# Patient Record
Sex: FEMALE | Race: WHITE
[De-identification: names, ages, dates, MRNs, and addresses within clinical notes are randomized per-mention and may not be internally consistent; named-entity substitution may affect disease eponyms.]

---

## 2017-01-15 ENCOUNTER — HOSPITAL ENCOUNTER (EMERGENCY)
Dept: HOSPITAL 59 - ER | Age: 32
Discharge: HOME | End: 2017-01-15
Payer: MEDICAID

## 2017-01-15 DIAGNOSIS — K02.9: Primary | ICD-10-CM

## 2017-01-15 PROCEDURE — 99282 EMERGENCY DEPT VISIT SF MDM: CPT

## 2017-01-15 NOTE — EMERGENCY DEPARTMENT RECORD
History of Present Illness





- General


Chief complaint: Dental


Stated complaint: DENTAL PAIN


Time Seen by Provider: 01/15/17 10:05


Source: Patient


Mode of Arrival: Ambulatory


Limitations: No limitations





- History of Present Illness


Initial comments: 


The patient is here due to L sided dental pain for 1 day. The upper and lower 

molars on the L seem to be painful. She denies any swelling, fever, chills, or 

trauma.





MD complaint: Tooth pain


Onset/Timin


-: Days(s)


Severity: Moderate


Severity scale (1-10): 10


Quality: Aching, Sharp


Consistency: Constant


Improves with: None





- Related Data


 Previous Rx's











 Medication  Instructions  Recorded


 


Naproxen [Naprosyn] 500 mg PO BID #14 tablet. 01/15/17


 


Penicillin V Potassium 500 mg PO QID #28 tablet 01/15/17











 Allergies











Allergy/AdvReac Type Severity Reaction Status Date / Time


 


diphenhydramine HCl Allergy Severe SWELLING Verified 01/15/17 10:00





[From Benadryl]   OF THE FACE  


 


divalproex sodium Allergy Severe RAPID Verified 01/15/17 10:00





[From Depakote]   HEART RATE  


 


citalopram hydrobromide Allergy Intermediate RASH Verified 01/15/17 10:00





[From Celexa]     


 


escitalopram oxalate Allergy Intermediate RASH Verified 01/15/17 10:00





[From Lexapro]     


 


haloperidol [From Haldol] Allergy Intermediate RASH Verified 01/15/17 10:00


 


haloperidol lactate Allergy Intermediate RASH Verified 01/15/17 10:00





[From Haldol]     


 


latex Allergy Intermediate RASH Verified 01/15/17 10:00














Travel Screening





- Travel/Exposure Within Last 30 Days


Have you traveled within the last 30 days?: No





- Travel/Exposure Within Last Year


Have you traveled outside the [place] in the last year?: No





- Additonal Travel Details


Have you been exposed to anyone with a communicable illness?: No





- Travel Symptoms


Symptom Screening: None





Review of Systems


Constitutional: Denies: Chills, Fever


Eyes: Denies: Eye discharge


ENT: Denies: Congestion


Respiratory: Denies: Cough, Dyspnea





Past Medical History





- SOCIAL HISTORY


Smoking Status: Current every day smoker


Alcohol Use: Occassional


Drug Use: None





- RESPIRATORY


Hx Respiratory Disorders: No





- CARDIOVASCULAR


Hx Cardio Disorders: Yes


Hx Hypertension: Yes





- NEURO


Hx Neuro Disorders: No





- GI


Hx GI Disorders: No





- 


Hx Genitourinary Disorders: No





- ENDOCRINE


Hx Endocrine Disorders: No





- MUSCULOSKELETAL


Hx Musculoskeletal Disorders: No





- PSYCH


Hx Psych Problems: Yes


Hx Anxiety: Yes





- HEMATOLOGY/ONCOLOGY


Hx Hematology/Oncology Disorders: No





Family Medical History


Any Significant Family History?: Yes


Hx Cancer: Father, Mother


Hx Depression: Father, Mother


Hx Stroke: Grandparents





Physical Exam





- General


General Appearance: Alert, Oriented x3, Cooperative, No acute distress





- Head


Head exam: Atraumatic, Normocephalic, Normal inspection





- Eye


Eye exam: Normal appearance, PERRL





- ENT


Teeth exam: Dental caries, Dental tenderness # (14, 15, and 18, 19. There is no 

abscess or gum line swelling.).  negative: Normal inspection


Throat exam: Normal inspection.  negative: Tonsillar erythema, Tonsillomegaly





- Neck


Neck exam: Normal inspection, Full ROM.  negative: Tenderness





- Respiratory


Respiratory exam: Normal lung sounds bilaterally.  negative: Respiratory 

distress





Course





 Vital Signs











  01/15/17 01/15/17





  09:55 10:01


 


Temperature 97.6 F 97.4 F L


 


Pulse Rate [  65





Pulse Ox Probe]  


 


Respiratory  12





Rate  


 


Blood Pressure  158/96





[Left Arm]  


 


Pulse Ox  98














- Reevaluation(s)


Reevaluation #1: 


I did discuss the plan with the patient to take the medicines and F/U with her 

Dentist ASAP.





01/15/17 10:12








Disposition


Disposition: Discharge


Clinical Impression: 


 Pain, dental


Disposition: Home, Self-Care


Condition: (1) Good


Instructions:  Toothache (ED)


Additional Instructions: 


Please take the Penicillin and Naprosyn as directed. Please see your Dentist 

tomorrow for recheck.


Prescriptions: 


Naproxen [Naprosyn] 500 mg PO BID #14 tablet.


Penicillin V Potassium 500 mg PO QID #28 tablet


Forms:  Patient Portal Access


Time of Disposition: 10:09

## 2017-04-16 ENCOUNTER — HOSPITAL ENCOUNTER (EMERGENCY)
Dept: HOSPITAL 59 - ER | Age: 32
Discharge: HOME | End: 2017-04-16
Payer: MEDICAID
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
## 2017-04-16 DIAGNOSIS — R51: ICD-10-CM

## 2017-04-16 DIAGNOSIS — R03.0: ICD-10-CM

## 2017-04-16 DIAGNOSIS — S40.011A: Primary | ICD-10-CM
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
## 2017-04-16 PROCEDURE — 99283 EMERGENCY DEPT VISIT LOW MDM: CPT

## 2017-04-16 PROCEDURE — 99284 EMERGENCY DEPT VISIT MOD MDM: CPT
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
## 2017-09-08 ENCOUNTER — HOSPITAL ENCOUNTER (EMERGENCY)
Dept: HOSPITAL 59 - ER | Age: 32
LOS: 1 days | Discharge: HOME | End: 2017-09-09
Payer: MEDICAID

## 2017-09-08 DIAGNOSIS — R11.2: ICD-10-CM

## 2017-09-08 DIAGNOSIS — R10.11: Primary | ICD-10-CM

## 2017-09-08 PROCEDURE — 81003 URINALYSIS AUTO W/O SCOPE: CPT

## 2017-09-08 PROCEDURE — 96375 TX/PRO/DX INJ NEW DRUG ADDON: CPT

## 2017-09-08 PROCEDURE — 99284 EMERGENCY DEPT VISIT MOD MDM: CPT

## 2017-09-08 PROCEDURE — 83690 ASSAY OF LIPASE: CPT

## 2017-09-08 PROCEDURE — 96374 THER/PROPH/DIAG INJ IV PUSH: CPT

## 2017-09-08 PROCEDURE — 85025 COMPLETE CBC W/AUTO DIFF WBC: CPT

## 2017-09-08 PROCEDURE — 81025 URINE PREGNANCY TEST: CPT

## 2017-09-08 PROCEDURE — 74176 CT ABD & PELVIS W/O CONTRAST: CPT

## 2017-09-08 PROCEDURE — 80053 COMPREHEN METABOLIC PANEL: CPT

## 2017-09-09 LAB
ALBUMIN SERPL-MCNC: 4 GM/DL (ref 3.5–5)
ALBUMIN/GLOB SERPL: 1.3 {RATIO} (ref 1.1–1.8)
ALP SERPL-CCNC: 67 U/L (ref 38–126)
ALT SERPL-CCNC: 32 U/L (ref 9–52)
ANION GAP SERPL CALC-SCNC: 7.2 MMOL/L (ref 7–16)
APPEARANCE UR: CLEAR
AST SERPL-CCNC: 15 U/L (ref 14–36)
BASOPHILS NFR BLD: 0.2 % (ref 0–6)
BILIRUB SERPL-MCNC: 0.45 MG/DL (ref 0.2–1.3)
BILIRUB UR-MCNC: NEGATIVE MG/DL
BUN SERPL-MCNC: 15 MG/DL (ref 7–17)
CO2 SERPL-SCNC: 24.8 MMOL/L (ref 22–30)
COLOR UR: YELLOW
CREAT SERPL-MCNC: 0.8 MG/DL (ref 0.52–1.04)
EOSINOPHIL NFR BLD: 1.6 % (ref 0–6)
ERYTHROCYTE [DISTWIDTH] IN BLOOD BY AUTOMATED COUNT: 13.2 % (ref 11.5–14.5)
EST GLOMERULAR FILTRATION RATE: > 60 ML/MIN
GLOBULIN SER-MCNC: 3 GM/DL (ref 1.4–4.8)
GLUCOSE SERPL-MCNC: 99 MG/DL (ref 70–110)
GLUCOSE UR STRIP-MCNC: NEGATIVE MG/DL
GRANULOCYTES NFR BLD: 53 % (ref 47–80)
HCG,QUALITATIVE URINE: NEGATIVE
HCT VFR BLD CALC: 41.1 % (ref 35–47)
HGB BLD-MCNC: 14.2 GM/DL (ref 11.6–16)
KETONES UR QL STRIP: NEGATIVE
LIPASE SERPL-CCNC: 107 U/L (ref 23–300)
LYMPHOCYTES NFR BLD AUTO: 37.3 % (ref 16–45)
MCH RBC QN AUTO: 31.1 PG (ref 27–33)
MCHC RBC AUTO-ENTMCNC: 34.5 G/DL (ref 32–36)
MCV RBC AUTO: 90.1 FL (ref 81–97)
MONOCYTES NFR BLD: 7.9 % (ref 0–9)
NITRITE UR QL STRIP: NEGATIVE
PLATELET # BLD: 266 K/UL (ref 130–400)
PMV BLD AUTO: 11.2 FL (ref 7.4–10.4)
PROT SERPL-MCNC: 7 GM/DL (ref 6.3–8.2)
PROT UR QL STRIP: NEGATIVE
RBC # BLD AUTO: 4.56 M/UL (ref 3.8–5.4)
RBC # UR STRIP: NEGATIVE /UL
URINE LEUKOCYTE ESTERASE: NEGATIVE
UROBILINOGEN UR STRIP-ACNC: 0.2 E.U./DL (ref 0.2–1)
WBC # BLD AUTO: 12.2 K/UL (ref 4.2–12.2)

## 2017-09-09 NOTE — EMERGENCY DEPARTMENT RECORD
History of Present Illness





- General


Chief Complaint: Abdominal Pain


Stated Complaint: ABDOMINAL PAIN


Time Seen by Provider: 17 23:25


Source: Patient, Family


Mode of Arrival: Ambulatory


Limitations: No limitations





- History of Present Illness


Initial Comments: 


33 yo female presents with 5 days of nausea, vomiting, urinary frequency, 

abdominal pain that starts on the right side.  No hematuria.  No diarrhea.  No 

fever.  She has a history of  2 times and histerectomy.  No rash.  No 

fever.  She has had gall bladder issues in the past.  No blood in the vomit or 

diarrhea.  No sick contacts.  No pain with urination.





MD Complaint: Abdominal pain


Onset/Timin


-: Week(s)


Location: R Flank


Radiation: LLQ, RLQ


Migration to: RUQ


Severity: Moderate


Quality: Cramping, Sharp, Stabbing


Consistency: Intermittent


Improves With: Nothing


Worsens With: Nothing


Associated Symptoms: Anorexia, Nausea, Vomiting, Other





- Related Data


Patient Pregnant: No


 Previous Rx's











 Medication  Instructions  Recorded


 


Hydrocodone/Acetaminophen [Norco 1 each PO Q8H #8 tablet 17





5-325 Tablet]  


 


Ondansetron [Zofran Odt] 4 mg PO Q8H #15 tab.rapdis 17











 Allergies











Allergy/AdvReac Type Severity Reaction Status Date / Time


 


diphenhydramine HCl Allergy Severe SWELLING Unverified 17 09:29





[From Benadryl]   OF THE FACE  


 


divalproex sodium Allergy Severe RAPID Unverified 17 09:29





[From Depakote]   HEART RATE  


 


citalopram hydrobromide Allergy Intermediate RASH Unverified 17 09:29





[From Celexa]     


 


escitalopram oxalate Allergy Intermediate RASH Unverified 17 09:29





[From Lexapro]     


 


haloperidol [From Haldol] Allergy Intermediate RASH Unverified 17 09:29


 


haloperidol lactate Allergy Intermediate RASH Unverified 17 09:29





[From Haldol]     


 


latex Allergy Intermediate RASH Unverified 17 09:29














Travel Screening





- Travel/Exposure Within Last 30 Days


Have you traveled within the last 30 days?: No





- Travel Symptoms


Symptom Screening: Vomiting, Stomach Pain





Review of Systems


Constitutional: Denies: Chills, Fever, Malaise, Weakness


Eyes: Denies: Eye discharge, Eye pain, Photophobia


ENT: Denies: Congestion, Throat pain


Respiratory: Denies: Cough, Dyspnea, Hemoptysis, Stridor, Wheezes


Cardiovascular: Denies: Chest pain, Palpitations, Syncope


Endocrine: Reports: Polyuria.  Denies: Fatigue


Gastrointestinal: Reports: Abdominal pain, Nausea, Vomiting.  Denies: Diarrhea, 

Hematemesis, Hematochezia, Melena


Genitourinary: Reports: Frequency, Urgency.  Denies: Discharge, Dysuria, 

Hematuria, Incontinence, Retention


Musculoskeletal: Reports: Back pain.  Denies: Arthralgia, Joint swelling, 

Myalgia, Neck pain


Skin: Denies: Bruising, Change in color, Rash


Neurological: Denies: Headache, Numbness, Vertigo, Weakness


Psychiatric: Denies: Anxiety


Hematological/Lymphatic: Denies: Blood Clots, Easy bleeding, Easy bruising, 

Swollen glands





Past Medical History





- SOCIAL HISTORY


Smoking Status: Current every day smoker





- RESPIRATORY


Hx Respiratory Disorders: No





- CARDIOVASCULAR


Hx Cardio Disorders: Yes


Hx Hypertension: Yes





- NEURO


Hx Neuro Disorders: No





- GI


Hx GI Disorders: No





- 


Hx Genitourinary Disorders: No





- ENDOCRINE


Hx Endocrine Disorders: No





- MUSCULOSKELETAL


Hx Musculoskeletal Disorders: No





- PSYCH


Hx Psych Problems: Yes


Hx Anxiety: Yes


Comment:: PTSD. scizophernia





- HEMATOLOGY/ONCOLOGY


Hx Hematology/Oncology Disorders: No





Family Medical History


Any Significant Family History?: Yes


Hx Cancer: Father, Mother


Hx Depression: Father, Mother


Hx Stroke: Grandparents





Physical Exam





- General


General Appearance: Alert, Oriented x3, Cooperative, No acute distress


Limitations: No limitations





- Head


Head exam: Atraumatic, Normal inspection





- Eye


Eye exam: Normal appearance, PERRL.  negative: Conjunctival injection, 

Periorbital swelling





- ENT


ENT exam: Normal exam, Mucous membranes moist, Normal orophraynx.  negative: 

Mucous membranes dry


Ear exam: Normal external inspection


Nasal Exam: Normal inspection


Mouth exam: Normal external inspection





- Neck


Neck exam: Normal inspection, Full ROM.  negative: Tenderness





- Respiratory


Respiratory exam: Normal lung sounds bilaterally.  negative: Respiratory 

distress





- Cardiovascular


Cardiovascular Exam: Regular rate, Normal rhythm, Normal heart sounds





- GI/Abdominal


GI/Abdominal exam: Soft, Tenderness (tender in the RUQ, right flank, soft, non 

distended).  negative: Distended, Guarding





- Rectal


Rectal exam: Deferred





- 


 exam: Deferred





- Extremities


Extremities exam: Normal inspection, Full ROM, Normal capillary refill.  

negative: Tenderness





- Back


Back exam: Reports: Normal inspection, CVA tenderness (R), Full ROM, 

Tenderness.  Denies: Muscle spasm, Rash noted





- Neurological


Neurological exam: Alert, Normal gait, Oriented X3





- Psychiatric


Psychiatric exam: Normal affect, Normal mood.  negative: Agitated, Anxious





- Skin


Skin exam: Dry, Intact, Normal color, Warm





Course





 Vital Signs











  17





  23:57


 


Temperature 98.1 F


 


Pulse Rate [ 71





Pulse Ox Probe] 


 


Respiratory 20





Rate 


 


Blood Pressure 138/78





[Left Arm] 


 


Pulse Ox 97














- Reevaluation(s)


Reevaluation #1: 


UA is normal with Spec Gravity of 1.030


17 00:45





Reevaluation #2: 


No acute changes on the CBC





[facility] CT scan demonstrates benign right adrenal adenoma, 2.1 cm benign left 

ovarian cyst.


17 01:09





Reevaluation #3: 


No acute changes on the CMP or Lipase


17 01:16








Medical Decision Making





- Lab Data


Result diagrams: 


 17 00:42





 17 00:42





Disposition


Disposition: Discharge


Clinical Impression: 


Vomiting


Qualifiers:


 Vomiting type: unspecified Vomiting Intractability: non-intractable Nausea 

presence: with nausea Qualified Code(s): R11.2 - Nausea with vomiting, 

unspecified





Abdominal pain


Qualifiers:


 Abdominal location: right upper quadrant Qualified Code(s): R10.11 - Right 

upper quadrant pain





Disposition: Home, Self-Care


Condition: (1) Good


Instructions:  Abdominal Pain (ED)


Additional Instructions: 


Return to the ER if you have fever, uncontrolled nausea, pain or any new 

concerns


Recheck your urine with your doctor first of the week if your urinary symptoms 

continue


If you pain continues then you may need further work up that could include a 

HIDA scan or GI consult.


Prescriptions: 


Hydrocodone/Acetaminophen [Norco 5-325 Tablet] 1 each PO Q8H #8 tablet


Ondansetron [Zofran Odt] 4 mg PO Q8H #15 tab.rapdis


Forms:  Patient Portal Access


Time of Disposition: 01:18





Quality





- Quality Measures


Quality Measures: N/A





- Blood Pressure Screening


Does Patient Have Any of the Following: No


Blood Pressure Classification: Pre-Hypertensive BP Reading


Systolic Measurement: 138


Diastolic Measurement: 78


Screening for High Blood Pressure: < Pre-Hypertensive BP, F/U Documented > [

]


Pre-Hypertensive Follow-up Interventions: Referral to alternative/primary care 

provider.

## 2017-09-09 NOTE — CT SCAN REPORT
EXAM:  CT SCAN ABDOMEN/PELVIS WO CONTRAST



HISTORY:  ACUTE RIGHT LOWER QUADRANT ABDOMINAL PAIN, FLANK PAIN.



COMPARISON:  None.



TECHNIQUE:  Contiguous axial images from the lung bases through the symphysis 
pubis were obtained without IV contrast.



FINDINGS:  The lung bases are clear. The liver, spleen, and kidneys are 
unremarkable. No calculi or hydronephrosis. Small right adrenal nodule 
measuring -5 Hounsfield units in density and 16 mm in size consistent with 
benign adenoma. Normal left adrenal. The pancreas and gallbladder are normal. 



Visualized loops of small and large bowel are of normal caliber with normal 
appendix. Small amount of fecal material throughout the colon. No bowel wall 
thickening. 



The urinary bladder is unremarkable. No free intraperitoneal fluid or 
adenopathy. Minimal aortic calcification. No lytic or blastic osseous lesion.



IMPRESSION: 



1.  NO ACUTE PROCESS OF THE ABDOMEN OR PELVIS. NORMAL APPENDIX.

2.  BENIGN RIGHT ADRENAL ADENOMA. 



JOB NUMBER:  [id number]
[facility]

## 2017-11-14 ENCOUNTER — HOSPITAL ENCOUNTER (EMERGENCY)
Dept: HOSPITAL 59 - ER | Age: 32
Discharge: HOME | End: 2017-11-14
Payer: MEDICAID

## 2017-11-14 DIAGNOSIS — F43.10: ICD-10-CM

## 2017-11-14 DIAGNOSIS — F41.9: Primary | ICD-10-CM

## 2017-11-14 DIAGNOSIS — F32.89: ICD-10-CM

## 2017-11-14 DIAGNOSIS — F20.9: ICD-10-CM

## 2017-11-14 PROCEDURE — 99282 EMERGENCY DEPT VISIT SF MDM: CPT

## 2017-11-14 NOTE — EMERGENCY DEPARTMENT RECORD
History of Present Illness





- General


Chief Complaint: Depression


Stated Complaint: MEDS MAKING PT FEEL DEPRESSED


Time Seen by Provider: 17 15:06


Source: Patient


Mode of Arrival: Ambulatory


Limitations: No limitations


Travel/Exposure to [place] Within 21 Days of Symptoms: No





- History of Present Illness


Initial Comments: 





pt has been on paxil since  by her family doctor and feels it is not 

working.  she feels angry and is breaking things.  she denies being suicidal or 

homicidal.  she has had 2 recent funerals and is depressed.  she attempted to 

be seen by [facility] and was sent over here


MD Complaint: Feels depressed


Onset/Timin


-: Week(s)


Associated Psychiatric Symptoms: Depression, Racing thoughts


History of same: Yes


Quality: Intermittent


Improves With: None


Worsens With: Medication


Context: New medication(s), Significant life stressor


Associated Symptoms: Denies other symptoms


Treatments Prior to Arrival: None





- [place] Coma Scale


Eye Response: (4) Open spontaneously


Motor Response: (6) Obeys commands


Verbal Response: (5) Oriented


[place] Total: 15





- Related Data


 Previous Rx's











 Medication  Instructions  Recorded


 


Ondansetron [Zofran Odt] 4 mg PO Q8H #15 tab.rapdis 17


 


Lorazepam [Ativan] 0.5 mg PO TID #20 tablet 17











 Allergies











Allergy/AdvReac Type Severity Reaction Status Date / Time


 


diphenhydramine HCl Allergy Severe SWELLING Verified 17 14:40





[From Benadryl]   OF THE FACE  


 


divalproex sodium Allergy Severe RAPID Verified 17 14:40





[From Depakote]   HEART RATE  


 


citalopram hydrobromide Allergy Intermediate RASH Verified 17 14:40





[From Celexa]     


 


escitalopram oxalate Allergy Intermediate RASH Verified 17 14:40





[From Lexapro]     


 


haloperidol [From Haldol] Allergy Intermediate RASH Verified 17 14:40


 


haloperidol lactate Allergy Intermediate RASH Verified 17 14:40





[From Haldol]     


 


latex Allergy Intermediate RASH Verified 17 14:40














Review of Systems


Reviewed: No additional complaints except as noted below


Constitutional: Reports: As per HPI.  Denies: Chills, Fever, Malaise, Night 

sweats, Weakness, Weight change


Eyes: Reports: As per HPI.  Denies: Eye discharge, Eye pain, Photophobia, 

Vision change


ENT: Reports: As per HPI.  Denies: Congestion, Dental pain, Ear pain, Epistaxis

, Hearing loss, Throat pain


Respiratory: Reports: As per HPI.  Denies: Cough, Dyspnea, Hemoptysis, Stridor, 

Wheezes


Cardiovascular: Reports: As per HPI.  Denies: Arrhythmia, Chest pain, Dyspnea 

on exertion, Edema, Murmurs, Orthopnea, Palpitations, Paroxysmal nocturnal 

dyspnea, Rheumatic Fever, Syncope


Endocrine: Reports: As per HPI.  Denies: Fatigue, Heat or cold intolerance, 

Polydipsia, Polyuria


Gastrointestinal: Reports: As per HPI.  Denies: Abdominal pain, Constipation, 

Diarrhea, Hematemesis, Hematochezia, Melena, Nausea, Vomiting


Genitourinary: Reports: As per HPI.  Denies: Abnormal menses, Discharge, 

Dyspareunia, Dysuria, Frequency, Hematuria, Incontinence, Retention, Urgency


Musculoskeletal: Reports: As per HPI.  Denies: Arthralgia, Back pain, Gout, 

Joint swelling, Myalgia, Neck pain


Skin: Reports: As per HPI.  Denies: Bruising, Change in color, Change in hair/

nails, Lesions, Pruritus, Rash


Neurological: Reports: As per HPI.  Denies: Abnormal gait, Confusion, Headache, 

Numbness, Paresthesias, Seizure, Tingling, Tremors, Vertigo, Weakness


Psychiatric: Reports: As per HPI.  Denies: Anxiety, Auditory hallucinations, 

Depression, Homicidal thoughts, Suicidal thoughts, Visual hallucinations


Hematological/Lymphatic: Reports: As per HPI.  Denies: Anemia, Blood Clots, 

Easy bleeding, Easy bruising, Swollen glands





Past Medical History





- SOCIAL HISTORY


Smoking Status: Current every day smoker


Alcohol Use: None


Drug Use: None





- RESPIRATORY


Hx Respiratory Disorders: No





- CARDIOVASCULAR


Hx Cardio Disorders: Yes


Hx Hypertension: Yes





- NEURO


Hx Neuro Disorders: No





- GI


Hx GI Disorders: No





- 


Hx Genitourinary Disorders: No





- ENDOCRINE


Hx Endocrine Disorders: No





- MUSCULOSKELETAL


Hx Musculoskeletal Disorders: No





- PSYCH


Hx Psych Problems: Yes


Hx Anxiety: Yes


Comment:: PTSD. scizophernia





- HEMATOLOGY/ONCOLOGY


Hx Hematology/Oncology Disorders: No





Family Medical History


Any Significant Family History?: Yes


Hx Cancer: Father, Mother


Hx Depression: Father, Mother


Hx Stroke: Grandparents





Physical Exam





- General


General Appearance: Alert, Oriented x3, Cooperative, Mild distress





- Head


Head exam: Normal inspection





- Eye


Eye exam: Normal appearance, PERRL, EOMI


Pupils: Normal accommodation





- ENT


ENT exam: Normal exam, Mucous membranes moist, Normal external ear exam, Normal 

orophraynx


Ear exam: Normal external inspection.  negative: External canal tenderness


Nasal Exam: Normal inspection.  negative: Discharge, Sinus tenderness


Mouth exam: Normal external inspection, Tongue normal


Teeth exam: Normal inspection.  negative: Dental caries


Throat exam: Normal inspection.  negative: Tonsillar erythema, Tonsillar exudate





- Neck


Neck exam: Normal inspection, Full ROM.  negative: Tenderness





- Respiratory


Respiratory exam: Normal lung sounds bilaterally.  negative: Respiratory 

distress





- Cardiovascular


Cardiovascular Exam: Regular rate, Normal rhythm, Normal heart sounds





- GI/Abdominal


GI/Abdominal exam: Soft, Normal bowel sounds.  negative: Tenderness





- Rectal


Rectal exam: Deferred





- 


 exam: Deferred





- Extremities


Extremities exam: Normal inspection, Full ROM, Normal capillary refill.  

negative: Tenderness





- Back


Back exam: Reports: Normal inspection, Full ROM.  Denies: Muscle spasm, Rash 

noted, Tenderness





- Neurological


Neurological exam: Alert, CN II-XII intact, Normal gait, Oriented X3





- Psychiatric


Psychiatric exam: Depressed, Normal mood





- Skin


Skin exam: Dry, Intact, Normal color, Warm





Course





 Vital Signs











  17





  14:32


 


Temperature 98.1 F


 


Pulse Rate 71


 


Respiratory 18





Rate 


 


Blood Pressure 177/97


 


Pulse Ox 97














- Reevaluation(s)


Reevaluation #1: 





17 15:22


case d/w dr richardson who came over to dept.  taper planned off of paxil. 

ativan to help with bridge and anxiety





Disposition


Disposition: Discharge


Clinical Impression: 


 Anxiety





Depression


Qualifiers:


 Depression Type: other depression Qualified Code(s): F32.89 - Other specified 

depressive episodes





Disposition: Home, Self-Care


Condition: (1) Good


Instructions:  Depression (ED), Anxiety (ED)


Additional Instructions: 


taper paxil to every other day.  follow up with dr richardson next week.  return 

sooner if worse


Prescriptions: 


Lorazepam [Ativan] 0.5 mg PO TID #20 tablet





Quality





- Quality Measures


Quality Measures: N/A





- Blood Pressure Screening


Does Patient Have Any of the Following: No


Blood Pressure Classification: Hypertensive Reading


Systolic Measurement: 177


Diastolic Measurement: 97


Screening for High Blood Pressure: < First Hypertensive BP, F/U Documented > [

]


First Hypertensive Follow-up Interventions: Follow-up with rescreen GT 1 day 

and LT 4 weeks.

## 2018-06-27 ENCOUNTER — HOSPITAL ENCOUNTER (EMERGENCY)
Dept: HOSPITAL 59 - ER | Age: 33
Discharge: HOME | End: 2018-06-27
Payer: MEDICAID

## 2018-06-27 DIAGNOSIS — R19.7: ICD-10-CM

## 2018-06-27 DIAGNOSIS — F17.210: ICD-10-CM

## 2018-06-27 DIAGNOSIS — R11.2: ICD-10-CM

## 2018-06-27 DIAGNOSIS — K29.00: Primary | ICD-10-CM

## 2018-06-27 DIAGNOSIS — I10: ICD-10-CM

## 2018-06-27 LAB
ALBUMIN SERPL-MCNC: 4.1 G/DL (ref 4–5)
ALP SERPL-CCNC: 82 U/L (ref 35–104)
ALT SERPL-CCNC: 16 U/L (ref ?–33)
ANION GAP SERPL CALC-SCNC: 17 MMOL/L (ref 7–16)
APPEARANCE UR: CLEAR
AST SERPL-CCNC: 13 U/L (ref 10–35)
BASOPHILS NFR BLD: 0.2 % (ref 0–6)
BILIRUB DIRECT SERPL-MCNC: < 0.2 MG/DL (ref 0–0.3)
BILIRUB SERPL-MCNC: 0.4 MG/DL (ref 0.2–1)
BILIRUB UR-MCNC: NEGATIVE MG/DL
BUN SERPL-MCNC: 11 MG/DL (ref 6–20)
CO2 SERPL-SCNC: 22 MMOL/L (ref 22–29)
COLOR UR: YELLOW
CREAT SERPL-MCNC: 0.6 MG/DL (ref 0.5–0.9)
EOSINOPHIL NFR BLD: 3 % (ref 0–6)
ERYTHROCYTE [DISTWIDTH] IN BLOOD BY AUTOMATED COUNT: 12.9 % (ref 11.5–14.5)
EST GLOMERULAR FILTRATION RATE: > 60 ML/MIN
GLUCOSE SERPL-MCNC: 93 MG/DL (ref 74–109)
GLUCOSE UR STRIP-MCNC: NEGATIVE MG/DL
GRANULOCYTES NFR BLD: 46.8 % (ref 47–80)
HCG,QUALITATIVE URINE: NEGATIVE
HCT VFR BLD CALC: 41.2 % (ref 35–47)
HGB BLD-MCNC: 14.2 GM/DL (ref 11.6–16)
KETONES UR QL STRIP: NEGATIVE
LIPASE SERPL-CCNC: 26 U/L (ref 13–60)
LYMPHOCYTES NFR BLD AUTO: 43.2 % (ref 16–45)
MCH RBC QN AUTO: 30.4 PG (ref 27–33)
MCHC RBC AUTO-ENTMCNC: 34.5 G/DL (ref 32–36)
MCV RBC AUTO: 88.2 FL (ref 81–97)
MONOCYTES NFR BLD: 6.8 % (ref 0–9)
NITRITE UR QL STRIP: NEGATIVE
PLATELET # BLD: 249 K/UL (ref 130–400)
PMV BLD AUTO: 10.8 FL (ref 7.4–10.4)
PROT SERPL-MCNC: 7 G/DL (ref 6.6–8.7)
PROT UR QL STRIP: NEGATIVE
RBC # BLD AUTO: 4.67 M/UL (ref 3.8–5.4)
RBC # UR STRIP: NEGATIVE /UL
URINE LEUKOCYTE ESTERASE: NEGATIVE
UROBILINOGEN UR STRIP-ACNC: 0.2 E.U./DL (ref 0.2–1)
WBC # BLD AUTO: 8 K/UL (ref 4.2–12.2)

## 2018-06-27 PROCEDURE — 81003 URINALYSIS AUTO W/O SCOPE: CPT

## 2018-06-27 PROCEDURE — 83690 ASSAY OF LIPASE: CPT

## 2018-06-27 PROCEDURE — 81025 URINE PREGNANCY TEST: CPT

## 2018-06-27 PROCEDURE — 96361 HYDRATE IV INFUSION ADD-ON: CPT

## 2018-06-27 PROCEDURE — 96374 THER/PROPH/DIAG INJ IV PUSH: CPT

## 2018-06-27 PROCEDURE — 80076 HEPATIC FUNCTION PANEL: CPT

## 2018-06-27 PROCEDURE — 80048 BASIC METABOLIC PNL TOTAL CA: CPT

## 2018-06-27 PROCEDURE — 85025 COMPLETE CBC W/AUTO DIFF WBC: CPT

## 2018-06-27 PROCEDURE — 99284 EMERGENCY DEPT VISIT MOD MDM: CPT

## 2018-06-27 NOTE — EMERGENCY DEPARTMENT RECORD
History of Present Illness





- General


Chief Complaint: Abdominal Pain


Stated Complaint: STOMACH PAINS


Time Seen by Provider: 18 11:59


Mode of Arrival: Ambulatory





- History of Present Illness


Initial Comments: 


vomiting and epigastric abdominal pain which started yesterday. she also has 

diarrhea.








Onset/Timin


-: Days(s)


Radiation: None


Quality: Aching


Consistency: Constant


Improves With: Nothing


Worsens With: Nothing


Associated Symptoms: Diarrhea, Fever, Nausea, Vomiting





- Related Data


Patient Pregnant: No


 Previous Rx's











 Medication  Instructions  Recorded


 


Omeprazole 20 mg PO DAILY #30 cap 18











 Allergies











Allergy/AdvReac Type Severity Reaction Status Date / Time


 


diphenhydramine HCl Allergy Severe SWELLING Verified 18 11:41





[From Benadryl]   OF THE FACE  


 


divalproex sodium Allergy Severe RAPID Verified 18 11:41





[From Depakote]   HEART RATE  


 


citalopram hydrobromide Allergy Intermediate RASH Verified 18 11:41





[From Celexa]     


 


escitalopram oxalate Allergy Intermediate RASH Verified 18 11:41





[From Lexapro]     


 


haloperidol [From Haldol] Allergy Intermediate RASH Verified 18 11:41


 


haloperidol lactate Allergy Intermediate RASH Verified 18 11:41





[From Haldol]     


 


latex Allergy Intermediate RASH Verified 18 11:41














Travel Screening





- Travel/Exposure Within Last 30 Days


Have you traveled within the last 30 days?: No





Review of Systems


Reviewed: No additional complaints except as noted below


Constitutional: Reports: As per HPI.  Denies: Chills, Fever, Malaise, Night 

sweats, Weakness, Weight change


Eyes: Reports: As per HPI.  Denies: Eye discharge, Eye pain, Photophobia, 

Vision change


ENT: Reports: As per HPI.  Denies: Congestion, Dental pain, Ear pain, Epistaxis

, Hearing loss, Throat pain


Respiratory: Reports: As per HPI.  Denies: Cough, Dyspnea, Hemoptysis, Stridor, 

Wheezes


Cardiovascular: Reports: As per HPI.  Denies: Arrhythmia, Chest pain, Dyspnea 

on exertion, Edema, Murmurs, Orthopnea, Palpitations, Paroxysmal nocturnal 

dyspnea, Rheumatic Fever, Syncope


Endocrine: Reports: As per HPI.  Denies: Fatigue, Heat or cold intolerance, 

Polydipsia, Polyuria


Gastrointestinal: Reports: As per HPI, Abdominal pain, Diarrhea, Vomiting.  

Denies: Constipation, Hematemesis, Hematochezia, Melena, Nausea


Genitourinary: Reports: As per HPI.  Denies: Abnormal menses, Discharge, 

Dyspareunia, Dysuria, Frequency, Hematuria, Incontinence, Retention, Urgency


Musculoskeletal: Reports: As per HPI.  Denies: Arthralgia, Back pain, Gout, 

Joint swelling, Myalgia, Neck pain


Skin: Reports: As per HPI.  Denies: Bruising, Change in color, Change in hair/

nails, Lesions, Pruritus, Rash


Neurological: Reports: As per HPI.  Denies: Abnormal gait, Confusion, Headache, 

Numbness, Paresthesias, Seizure, Tingling, Tremors, Vertigo, Weakness


Psychiatric: Reports: As per HPI.  Denies: Anxiety, Auditory hallucinations, 

Depression, Homicidal thoughts, Suicidal thoughts, Visual hallucinations


Hematological/Lymphatic: Reports: As per HPI.  Denies: Anemia, Blood Clots, 

Easy bleeding, Easy bruising, Swollen glands





Past Medical History





- SOCIAL HISTORY


Smoking Status: Current every day smoker


Alcohol Use: None


Drug Use: None





- RESPIRATORY


Hx Respiratory Disorders: No





- CARDIOVASCULAR


Hx Cardio Disorders: Yes


Hx Hypertension: Yes





- NEURO


Hx Neuro Disorders: No





- GI


Hx GI Disorders: No





- 


Hx Genitourinary Disorders: No





- ENDOCRINE


Hx Endocrine Disorders: No





- MUSCULOSKELETAL


Hx Musculoskeletal Disorders: No





- PSYCH


Hx Psych Problems: Yes


Hx Anxiety: Yes


Comment:: PTSD. scizophernia





- HEMATOLOGY/ONCOLOGY


Hx Hematology/Oncology Disorders: No





Family Medical History


Any Significant Family History?: Yes


Hx Cancer: Father, Mother


Hx Depression: Father, Mother


Hx Stroke: Grandparents





Physical Exam





- General


General Appearance: Alert, Oriented x3, Cooperative, No acute distress





- Head


Head exam: Normal inspection





- Eye


Eye exam: Normal appearance, PERRL


Pupils: Normal accommodation





- ENT


ENT exam: Normal exam, Mucous membranes moist, Normal external ear exam, Normal 

orophraynx, TM's normal bilaterally


Ear exam: Normal external inspection.  negative: External canal tenderness


Nasal Exam: Normal inspection.  negative: Discharge, Sinus tenderness


Mouth exam: Normal external inspection, Tongue normal


Teeth exam: Normal inspection.  negative: Dental caries


Throat exam: Normal inspection.  negative: Tonsillar erythema, Tonsillar exudate





- Neck


Neck exam: Normal inspection, Full ROM.  negative: Tenderness





- Respiratory


Respiratory exam: Normal lung sounds bilaterally.  negative: Respiratory 

distress





- Cardiovascular


Cardiovascular Exam: Regular rate, Normal rhythm, Normal heart sounds





- GI/Abdominal


GI/Abdominal exam: Soft, Normal bowel sounds, Tenderness (epigastric discomfort)





- Rectal


Rectal exam: Deferred





- 


 exam: Deferred





- Extremities


Extremities exam: Normal inspection, Full ROM, Normal capillary refill.  

negative: Tenderness





- Back


Back exam: Reports: Normal inspection, Full ROM.  Denies: Muscle spasm, Rash 

noted, Tenderness





- Neurological


Neurological exam: Alert, Normal gait, Oriented X3, Reflexes normal





- Psychiatric


Psychiatric exam: Normal affect, Normal mood





- Skin


Skin exam: Dry, Intact, Normal color, Warm





Course





 Vital Signs











  18





  11:38


 


Temperature 98.4 F


 


Pulse Rate 91 H


 


Respiratory 18





Rate 


 


Blood Pressure 122/92


 


Pulse Ox 94 L








GI cocktail relieved her epigastric pain





- Reevaluation(s)


Reevaluation #1: 


Patient feels better


18 13:52








Medical Decision Making





- Data Complexity


MDM Data: Labs Ordered and/or Reviewed (UA negative, WBC 8,000)





- Lab Data


Result diagrams: 


 18 11:45





 18 11:45





Disposition


Clinical Impression: 


Gastritis


Qualifiers:


 Gastritis type: unspecified gastritis Chronicity: acute Gastritis bleeding: 

without bleeding Qualified Code(s): K29.00 - Acute gastritis without bleeding





Disposition: Home, Self-Care


Condition: (1) Good


Instructions:  Gastritis (ED)


Additional Instructions: 


follow up with family  in 2 days


clear liquids for 24 hours than bland foods





Prescriptions: 


Omeprazole 20 mg PO DAILY #30 cap.dr


Forms:  Patient Portal Access


Time of Disposition: 13:52





Quality





- Quality Measures


Quality Measures: N/A





- Blood Pressure Screening


Does Patient Have Any of the Following: No


Blood Pressure Classification: Hypertensive Reading


Systolic Measurement: 122


Diastolic Measurement: 92


Screening for High Blood Pressure: < Pre-Hypertensive BP, F/U Documented > [

]


Pre-Hypertensive Follow-up Interventions: Referral to alternative/primary care 

provider.

## 2018-07-20 ENCOUNTER — HOSPITAL ENCOUNTER (EMERGENCY)
Dept: HOSPITAL 59 - ER | Age: 33
Discharge: HOME | End: 2018-07-20
Payer: MEDICAID

## 2018-07-20 DIAGNOSIS — F17.210: ICD-10-CM

## 2018-07-20 DIAGNOSIS — I10: ICD-10-CM

## 2018-07-20 DIAGNOSIS — K80.50: Primary | ICD-10-CM

## 2018-07-20 DIAGNOSIS — R11.0: ICD-10-CM

## 2018-07-20 LAB
ALBUMIN SERPL-MCNC: 3.7 G/DL (ref 4–5)
ALBUMIN/GLOB SERPL: 1.4 {RATIO} (ref 1.1–1.8)
ALP SERPL-CCNC: 73 U/L (ref 35–104)
ALT SERPL-CCNC: 8 U/L (ref ?–33)
ANION GAP SERPL CALC-SCNC: 11 MMOL/L (ref 7–16)
APPEARANCE UR: CLEAR
AST SERPL-CCNC: 10 U/L (ref 10–35)
BASOPHILS NFR BLD: 0.2 % (ref 0–6)
BILIRUB SERPL-MCNC: < 0.2 MG/DL (ref 0.2–1)
BILIRUB UR-MCNC: NEGATIVE MG/DL
BUN SERPL-MCNC: 12 MG/DL (ref 6–20)
CO2 SERPL-SCNC: 26 MMOL/L (ref 22–29)
COLOR UR: YELLOW
CREAT SERPL-MCNC: 0.6 MG/DL (ref 0.5–0.9)
EOSINOPHIL NFR BLD: 2.2 % (ref 0–6)
ERYTHROCYTE [DISTWIDTH] IN BLOOD BY AUTOMATED COUNT: 13 % (ref 11.5–14.5)
EST GLOMERULAR FILTRATION RATE: > 60 ML/MIN
GLOBULIN SER-MCNC: 2.7 GM/DL (ref 1.4–4.8)
GLUCOSE SERPL-MCNC: 104 MG/DL (ref 74–109)
GLUCOSE UR STRIP-MCNC: NEGATIVE MG/DL
GRANULOCYTES NFR BLD: 51.4 % (ref 47–80)
HCT VFR BLD CALC: 39.3 % (ref 35–47)
HGB BLD-MCNC: 13 GM/DL (ref 11.6–16)
KETONES UR QL STRIP: NEGATIVE
LIPASE SERPL-CCNC: 55 U/L (ref 13–60)
LYMPHOCYTES NFR BLD AUTO: 39.5 % (ref 16–45)
MCH RBC QN AUTO: 30 PG (ref 27–33)
MCHC RBC AUTO-ENTMCNC: 33.1 G/DL (ref 32–36)
MCV RBC AUTO: 90.6 FL (ref 81–97)
MONOCYTES NFR BLD: 6.7 % (ref 0–9)
NITRITE UR QL STRIP: NEGATIVE
PLATELET # BLD: 256 K/UL (ref 130–400)
PMV BLD AUTO: 11.3 FL (ref 7.4–10.4)
PROT SERPL-MCNC: 6.4 G/DL (ref 6.6–8.7)
PROT UR QL STRIP: NEGATIVE
RBC # BLD AUTO: 4.34 M/UL (ref 3.8–5.4)
RBC # UR STRIP: NEGATIVE /UL
URINE LEUKOCYTE ESTERASE: NEGATIVE
UROBILINOGEN UR STRIP-ACNC: 0.2 E.U./DL (ref 0.2–1)
WBC # BLD AUTO: 9.8 K/UL (ref 4.2–12.2)

## 2018-07-20 PROCEDURE — 81003 URINALYSIS AUTO W/O SCOPE: CPT

## 2018-07-20 PROCEDURE — 85025 COMPLETE CBC W/AUTO DIFF WBC: CPT

## 2018-07-20 PROCEDURE — 80053 COMPREHEN METABOLIC PANEL: CPT

## 2018-07-20 PROCEDURE — 99284 EMERGENCY DEPT VISIT MOD MDM: CPT

## 2018-07-20 PROCEDURE — 96375 TX/PRO/DX INJ NEW DRUG ADDON: CPT

## 2018-07-20 PROCEDURE — 83690 ASSAY OF LIPASE: CPT

## 2018-07-20 PROCEDURE — 74176 CT ABD & PELVIS W/O CONTRAST: CPT

## 2018-07-20 PROCEDURE — 96374 THER/PROPH/DIAG INJ IV PUSH: CPT

## 2018-07-20 NOTE — EMERGENCY DEPARTMENT RECORD
History of Present Illness





- General


Chief Complaint: Abdominal Pain


Stated Complaint: ABDOMINAL PAIN


Time Seen by Provider: 07/20/18 00:26


Source: Patient


Mode of Arrival: Ambulatory


Limitations: No limitations





- History of Present Illness


Initial Comments: 





pt has been having abd pain for 2 days in ruq and thru to her back.  it started 

out after eating.  she has n.  no v/c/d. she was seen in june for similar 

complaints.  she has not f/u w her family doctor


-: Month(s)


Location: RUQ


Radiation: Back


Severity scale (1-10): 5


Consistency: Intermittent


Improves With: Rest


Worsens With: Eating


Associated Symptoms: Nausea





- Related Data


Patient Pregnant: No


 Previous Rx's











 Medication  Instructions  Recorded


 


Omeprazole 20 mg PO DAILY #30 cap. 06/27/18











 Allergies











Allergy/AdvReac Type Severity Reaction Status Date / Time


 


diphenhydramine HCl Allergy Severe SWELLING Verified 06/27/18 11:41





[From Benadryl]   OF THE FACE  


 


divalproex sodium Allergy Severe RAPID Verified 06/27/18 11:41





[From Depakote]   HEART RATE  


 


citalopram hydrobromide Allergy Intermediate RASH Verified 06/27/18 11:41





[From Celexa]     


 


escitalopram oxalate Allergy Intermediate RASH Verified 06/27/18 11:41





[From Lexapro]     


 


haloperidol [From Haldol] Allergy Intermediate RASH Verified 06/27/18 11:41


 


haloperidol lactate Allergy Intermediate RASH Verified 06/27/18 11:41





[From Haldol]     


 


latex Allergy Intermediate RASH Verified 06/27/18 11:41














Travel Screening





- Travel/Exposure Within Last 30 Days


Have you traveled within the last 30 days?: No





- Travel Symptoms


Symptom Screening: None





Review of Systems


Reviewed: No additional complaints except as noted below


Constitutional: Reports: As per HPI.  Denies: Chills, Fever, Malaise, Night 

sweats, Weakness, Weight change


Eyes: Reports: As per HPI.  Denies: Eye discharge, Eye pain, Photophobia, 

Vision change


ENT: Reports: As per HPI.  Denies: Congestion, Dental pain, Ear pain, Epistaxis

, Hearing loss, Throat pain


Respiratory: Reports: As per HPI.  Denies: Cough, Dyspnea, Hemoptysis, Stridor, 

Wheezes


Cardiovascular: Reports: As per HPI.  Denies: Arrhythmia, Chest pain, Dyspnea 

on exertion, Edema, Murmurs, Orthopnea, Palpitations, Paroxysmal nocturnal 

dyspnea, Rheumatic Fever, Syncope


Endocrine: Reports: As per HPI.  Denies: Fatigue, Heat or cold intolerance, 

Polydipsia, Polyuria


Gastrointestinal: Reports: As per HPI.  Denies: Abdominal pain, Constipation, 

Diarrhea, Hematemesis, Hematochezia, Melena, Nausea, Vomiting


Genitourinary: Reports: As per HPI.  Denies: Abnormal menses, Discharge, 

Dyspareunia, Dysuria, Frequency, Hematuria, Incontinence, Retention, Urgency


Musculoskeletal: Reports: As per HPI.  Denies: Arthralgia, Back pain, Gout, 

Joint swelling, Myalgia, Neck pain


Skin: Reports: As per HPI.  Denies: Bruising, Change in color, Change in hair/

nails, Lesions, Pruritus, Rash


Neurological: Reports: As per HPI.  Denies: Abnormal gait, Confusion, Headache, 

Numbness, Paresthesias, Seizure, Tingling, Tremors, Vertigo, Weakness


Psychiatric: Reports: As per HPI.  Denies: Anxiety, Auditory hallucinations, 

Depression, Homicidal thoughts, Suicidal thoughts, Visual hallucinations


Hematological/Lymphatic: Reports: As per HPI.  Denies: Anemia, Blood Clots, 

Easy bleeding, Easy bruising, Swollen glands





Past Medical History





- SOCIAL HISTORY


Smoking Status: Current every day smoker





- RESPIRATORY


Hx Respiratory Disorders: No





- CARDIOVASCULAR


Hx Cardio Disorders: Yes


Hx Hypertension: Yes





- NEURO


Hx Neuro Disorders: No





- GI


Hx GI Disorders: No





- 


Hx Genitourinary Disorders: No





- ENDOCRINE


Hx Endocrine Disorders: No





- MUSCULOSKELETAL


Hx Musculoskeletal Disorders: No





- PSYCH


Hx Psych Problems: Yes


Hx Anxiety: Yes


Hx Depression: Yes


Comment:: PTSD. scizophernia





- HEMATOLOGY/ONCOLOGY


Hx Hematology/Oncology Disorders: No





Family Medical History


Any Significant Family History?: Yes


Hx Cancer: Father, Mother


Hx Depression: Father, Mother


Hx Stroke: Grandparents





Physical Exam





- General


General Appearance: Alert, Oriented x3, Cooperative, Mild distress





- Head


Head exam: Normal inspection





- Eye


Eye exam: Normal appearance, PERRL, EOMI


Pupils: Normal accommodation





- ENT


ENT exam: Normal exam, Mucous membranes moist, Normal external ear exam, Normal 

orophraynx


Ear exam: Normal external inspection.  negative: External canal tenderness


Nasal Exam: Normal inspection.  negative: Discharge, Sinus tenderness


Mouth exam: Normal external inspection, Tongue normal


Teeth exam: Normal inspection.  negative: Dental caries


Throat exam: Normal inspection.  negative: Tonsillar erythema, Tonsillar exudate





- Neck


Neck exam: Normal inspection, Full ROM.  negative: Tenderness





- Respiratory


Respiratory exam: Normal lung sounds bilaterally.  negative: Respiratory 

distress





- Cardiovascular


Cardiovascular Exam: Regular rate, Normal rhythm, Normal heart sounds





- GI/Abdominal


GI/Abdominal exam: Soft, Normal bowel sounds, Tenderness (ruq and rlq)





- Rectal


Rectal exam: Deferred





- 


 exam: Deferred





- Extremities


Extremities exam: Normal inspection, Full ROM, Normal capillary refill.  

negative: Tenderness





- Back


Back exam: Reports: Normal inspection, Full ROM.  Denies: Muscle spasm, Rash 

noted, Tenderness





- Neurological


Neurological exam: Alert, Normal gait, Oriented X3, Reflexes normal





- Psychiatric


Psychiatric exam: Normal affect, Normal mood





- Skin


Skin exam: Dry, Intact, Normal color, Warm





Course





 Vital Signs











  07/20/18





  00:21


 


Temperature 98.1 F


 


Pulse Rate [ 64





Pulse Ox Probe] 


 


Respiratory 16





Rate 


 


Blood Pressure 128/82





[Left Arm] 


 


Pulse Ox 99














- Reevaluation(s)


Reevaluation #1: 





07/20/18 02:17


ct neg for acute. gb contracted





Medical Decision Making





- Lab Data


Result diagrams: 


 07/20/18 00:35





 07/20/18 00:35





Disposition


Disposition: Discharge


Clinical Impression: 


 Biliary colic





Disposition: Home, Self-Care


Condition: (1) Good


Instructions:  Biliary Colic (ED)


Additional Instructions: 


return this am after 7am if still having right upper quadrant pain for 

ultrasound.  return sooner if worse.  bland diet


Forms:  Patient Portal Access





Quality





- Quality Measures


Quality Measures: N/A





- Blood Pressure Screening


Does Patient Have Any of the Following: No


Blood Pressure Classification: Pre-Hypertensive BP Reading


Systolic Measurement: 129


Diastolic Measurement: 77


Screening for High Blood Pressure: < Pre-Hypertensive BP, F/U Documented > [

]


Pre-Hypertensive Follow-up Interventions: Follow-up with rescreen every year.

## 2018-07-21 NOTE — CT SCAN REPORT
EXAM:  CT SCAN ABDOMEN/PELVIS WO CONTRAST 



HISTORY:  RIGHT UPPER QUADRANT ABDOMINAL PAIN FOR SEVERAL MONTHS. RIGHT LOWER 
QUADRANT ABDOMINAL PAIN FOR ONE DAY.  



TECHNIQUE:  Routine helical CT examination of the abdomen and pelvis is 
performed without oral or intravenous contrast administration. Lack of oral and 
IV contrast utilization limits evaluation of the bowel and solid viscera, 
respectively.



COMPARISON:  CT abdomen and pelvis without contrast dated 09/09/2017.



FINDINGS:  The lung bases are clear and there is no pleural or pericardial 
effusion. The heart is not enlarged.



The liver, spleen, pancreas, left adrenal gland and kidneys are normal in 
appearance. There is a small right adrenal gland mass redemonstrated measuring 
2.3 cm in maximum diameter, unchanged. This has a density of minus 10 
Hounsfield units and is consistent with an adenoma. This is stable.



The gallbladder is contracted. No cholelithiasis, gallbladder wall thickening, 
or pericholecystic fluid. No biliary ductal dilatation.



No intraabdominal nor retroperitoneal lymphadenopathy. The vasculature, as 
visualized, is unremarkable. 



The uterus is surgically absent. Evaluation of the urinary bladder is limited 
by lack of distention. Trace fluid is suggested within the pelvis. This is 
nonspecific. There are cystic areas within the right hemipelvis, likely arising 
within the right ovary. The largest of these measures 2.1 cm in maximum 
diameter. These are consistent with functional ovarian cysts/follicles. There 
are follicles/cysts suggested in a superiorly positioned left ovary with a 
couple punctate calcifications along the margin of one of these cysts. These 
calcifications are, however, stable and are likely postinflammatory.



No gross bowel dilatation nor bowel wall thickening. The appendix is visualized 
and normal in appearance. 



The abdominal wall appears intact. 



No lytic or blastic bone lesion. 



IMPRESSION:  



1.  FOLLICLES/CYSTS WITHIN THE OVARIES, RIGHT GREATER THAN LEFT. THERE IS A 
SMALL AMOUNT OF FREE FLUID WITHIN THE PELVIS, RIGHT OF MIDLINE. THIS MAY RELATE 
TO RECENT OVARIAN FOLLICLE/CYST RUPTURE. 

2.  NORMAL APPENDIX.

3.  STABLE RIGHT ADRENAL ADENOMA.

4.  SURGICAL ABSENCE OF THE UTERUS. 



JOB NUMBER:  [id number]
[facility]

## 2018-12-21 ENCOUNTER — HOSPITAL ENCOUNTER (EMERGENCY)
Dept: HOSPITAL 59 - ER | Age: 33
Discharge: HOME | End: 2018-12-21
Payer: MEDICAID

## 2018-12-21 DIAGNOSIS — I10: ICD-10-CM

## 2018-12-21 DIAGNOSIS — F41.9: Primary | ICD-10-CM

## 2018-12-21 DIAGNOSIS — F17.210: ICD-10-CM

## 2018-12-21 PROCEDURE — 99283 EMERGENCY DEPT VISIT LOW MDM: CPT
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
## 2019-06-21 ENCOUNTER — HOSPITAL ENCOUNTER (EMERGENCY)
Dept: HOSPITAL 59 - ER | Age: 34
Discharge: HOME | End: 2019-06-21
Payer: MEDICAID

## 2019-06-21 DIAGNOSIS — M54.2: ICD-10-CM

## 2019-06-21 DIAGNOSIS — F17.210: ICD-10-CM

## 2019-06-21 DIAGNOSIS — R11.0: ICD-10-CM
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
## 2019-06-21 DIAGNOSIS — R51: ICD-10-CM

## 2019-06-21 DIAGNOSIS — S09.90XA: Primary | ICD-10-CM
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
## 2019-06-21 DIAGNOSIS — I10: ICD-10-CM

## 2019-06-21 PROCEDURE — 70450 CT HEAD/BRAIN W/O DYE: CPT

## 2019-06-21 PROCEDURE — 99283 EMERGENCY DEPT VISIT LOW MDM: CPT
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
## 2019-08-16 ENCOUNTER — HOSPITAL ENCOUNTER (EMERGENCY)
Dept: HOSPITAL 59 - ER | Age: 34
Discharge: HOME | End: 2019-08-16
Payer: MEDICAID

## 2019-08-16 DIAGNOSIS — Z71.1: ICD-10-CM

## 2019-08-16 DIAGNOSIS — L29.9: Primary | ICD-10-CM

## 2019-08-16 DIAGNOSIS — I10: ICD-10-CM

## 2019-08-16 DIAGNOSIS — F17.210: ICD-10-CM

## 2019-08-16 PROCEDURE — 99282 EMERGENCY DEPT VISIT SF MDM: CPT

## 2019-08-16 NOTE — EMERGENCY DEPARTMENT RECORD
History of Present Illness





- General


Chief Complaint: Recheck - Other


Stated Complaint: SCABIES/RECHECK


Time Seen by Provider: 08/16/19 02:21


Source: Patient


Mode of arrival: Ambulatory


Limitations: No limitations





- History of Present Illness


Initial Comments: 





Pt concerned that she has scabies.  Complains of itching and bugs on skin.  No 

fever.  Using over the counter "Ivy dry" for itch.  No other complaint. 





- Related Data


                                Home Medications











 Medication  Instructions  Recorded  Confirmed  Last Taken


 


No Home Med [NO HOME MEDS]  08/16/19 08/16/19 Unknown











                                    Allergies











Allergy/AdvReac Type Severity Reaction Status Date / Time


 


diphenhydramine HCl Allergy Severe SWELLING Unverified 08/15/19 18:42





[From Benadryl]   OF THE FACE  


 


divalproex sodium Allergy Severe RAPID Unverified 08/15/19 18:42





[From Depakote]   HEART RATE  


 


citalopram hydrobromide Allergy Intermediate RASH Unverified 08/15/19 18:42





[From Celexa]     


 


escitalopram oxalate Allergy Intermediate RASH Unverified 08/15/19 18:42





[From Lexapro]     


 


haloperidol [From Haldol] Allergy Intermediate RASH Unverified 08/15/19 18:42


 


haloperidol lactate Allergy Intermediate RASH Unverified 08/15/19 18:42





[From Haldol]     


 


latex Allergy Intermediate RASH Unverified 08/15/19 18:42














Review of Systems


Constitutional: Denies: Chills


Eyes: Denies: Eye discharge


ENT: Denies: Congestion


Respiratory: Denies: Cough


Cardiovascular: Denies: Arrhythmia


Endocrine: Denies: Fatigue


Gastrointestinal: Denies: Abdominal pain


Skin: Reports: As per HPI





Past Medical History





- SOCIAL HISTORY


Smoking Status: Current every day smoker


Alcohol Use: None


Drug Use: None





- RESPIRATORY


Hx Respiratory Disorders: No





- CARDIOVASCULAR


Hx Cardio Disorders: Yes


Hx Hypertension: Yes





- NEURO


Hx Neuro Disorders: No





- GI


Hx GI Disorders: No





- 


Hx Genitourinary Disorders: No





- ENDOCRINE


Hx Endocrine Disorders: No





- MUSCULOSKELETAL


Hx Musculoskeletal Disorders: No





- PSYCH


Hx Psych Problems: Yes


Hx Anxiety: Yes


Hx Depression: Yes


Comment:: PTSD. scizophernia





- HEMATOLOGY/ONCOLOGY


Hx Hematology/Oncology Disorders: No





Family Medical History


Any Significant Family History?: Yes


Hx Cancer: Father, Mother


Hx Depression: Father, Mother


Hx Stroke: Grandparents





Physical Exam





- General


General Appearance: Alert, Oriented x3, Cooperative, No acute distress





- Head


Head exam: Atraumatic





- Eye


Eye exam: PERRL





- ENT


ENT exam: Mucous membranes moist





- Neck


Neck exam: Normal inspection, Full ROM





- Respiratory


Respiratory exam: Normal lung sounds bilaterally.  negative: Respiratory 

distress





- Cardiovascular


Cardiovascular Exam: Regular rate, Normal rhythm





- GI/Abdominal


GI/Abdominal exam: Soft, Normal bowel sounds





- Skin


Skin exam: Other (Multiple areas of skin on arms and upper back with evidence of

scratching.  IVY DRY cream on sitres.  No eveidence of tracts to web spaces or 

belt line.  )





Course





- Reevaluation(s)


Reevaluation #1: 





08/16/19 02:31


seen with concern for scabies.  At this tiem exam is not consistent with 

scabies.  Discussed OTC treatment with RID/NIX





Disposition


Disposition: Discharge


Clinical Impression: 


 No problem, feared complaint unfounded





Condition: (1) Good


Additional Instructions: 


If concern for scabies purchase over the counter RID or NIX and use as directed 

on box. 


See your family doctor as needed.  





Forms:  Patient Portal Access


Time of Disposition: 02:27





Quality





- Quality Measures


Quality Measures: N/A





- Blunt Head Trauma - Adult


ICD10 Codes Entered: No


Utilization of CT for Minor Blunt Head Trauma: Not Eligible For Measure





- Blood Pressure Screening


Does Patient Have Any of the Following: No


Blood Pressure Classification: Hypertensive Reading


Systolic Measurement: 150


Diastolic Measurement: 102


Screening for High Blood Pressure: < Pre-Hypertensive BP, F/U Documented > 

[]


Pre-Hypertensive Follow-up Interventions: Follow-up with rescreen every year.

## 2019-08-18 ENCOUNTER — HOSPITAL ENCOUNTER (EMERGENCY)
Dept: HOSPITAL 59 - ER | Age: 34
Discharge: HOME | End: 2019-08-18
Payer: MEDICAID

## 2019-08-18 DIAGNOSIS — F17.210: ICD-10-CM

## 2019-08-18 DIAGNOSIS — B86: Primary | ICD-10-CM

## 2019-08-18 DIAGNOSIS — I10: ICD-10-CM

## 2019-08-18 PROCEDURE — 99283 EMERGENCY DEPT VISIT LOW MDM: CPT

## 2019-08-18 NOTE — EMERGENCY DEPARTMENT RECORD
History of Present Illness





- General


Stated complaint: RASH


Time Seen by Provider: 08/18/19 11:01


Source: Patient


Mode of Arrival: Ambulatory


Limitations: No limitations





- History of Present Illness


Initial comments: 





35 yo female presents with a concern about a rash.  She is concerned about 

scabies.  She states she has even seen the mites.  She has small scabs in 

various areas.  No fevers.  She suspects her cats.  She is using a topical anti 

itch cream currently.


MD complaint: Rash


-: Days(s)


Hx Tetanus Toxoid Vaccination: Yes


Year of Tetanus Vaccination: 2013


Location: Generalized


Severity: Moderate


Quality: Other (itches)


Consistency: Constant


Improves with: Other (itch cream)


Associated symptoms: Other (itches)


Treatments Prior to Arrival: Benadryl





- Related Data


                                  Previous Rx's











 Medication  Instructions  Recorded


 


Permethrin [Elimite] 60 gm TP WEEKLY #1 cream..g. 08/18/19


 


Permethrin [Nix] 1 apply TOP ASDIR #59 ml 08/18/19











                                    Allergies











Allergy/AdvReac Type Severity Reaction Status Date / Time


 


diphenhydramine HCl Allergy Severe SWELLING Verified 08/18/19 11:06





[From Benadryl]   OF THE FACE  


 


divalproex sodium Allergy Severe RAPID Verified 08/18/19 11:06





[From Depakote]   HEART RATE  


 


citalopram hydrobromide Allergy Intermediate RASH Verified 08/18/19 11:06





[From Celexa]     


 


escitalopram oxalate Allergy Intermediate RASH Verified 08/18/19 11:06





[From Lexapro]     


 


haloperidol [From Haldol] Allergy Intermediate RASH Verified 08/18/19 11:06


 


haloperidol lactate Allergy Intermediate RASH Verified 08/18/19 11:06





[From Haldol]     


 


latex Allergy Intermediate RASH Verified 08/18/19 11:06














Review of Systems


Constitutional: Denies: Chills, Fever, Malaise, Weakness


Eyes: Denies: Eye discharge


ENT: Denies: Congestion, Throat pain


Respiratory: Denies: Cough, Dyspnea


Cardiovascular: Denies: Chest pain, Syncope


Endocrine: Denies: Fatigue


Gastrointestinal: Denies: Abdominal pain, Diarrhea, Nausea, Vomiting


Genitourinary: Denies: Dysuria, Hematuria


Musculoskeletal: Denies: Arthralgia, Back pain, Myalgia


Skin: Reports: As per HPI, Change in color, Rash.  Denies: Bruising


Neurological: Denies: Numbness, Tingling, Weakness


Psychiatric: Denies: Anxiety


Hematological/Lymphatic: Denies: Easy bleeding, Easy bruising





Past Medical History





- SOCIAL HISTORY


Smoking Status: Current every day smoker


Drug Use: None





- RESPIRATORY


Hx Respiratory Disorders: No





- CARDIOVASCULAR


Hx Cardio Disorders: Yes


Hx Hypertension: Yes





- NEURO


Hx Neuro Disorders: No





- GI


Hx GI Disorders: No





- 


Hx Genitourinary Disorders: No





- ENDOCRINE


Hx Endocrine Disorders: No





- MUSCULOSKELETAL


Hx Musculoskeletal Disorders: No





- PSYCH


Hx Psych Problems: Yes


Hx Anxiety: Yes


Hx Depression: Yes


Comment:: PTSD. scizophernia





- HEMATOLOGY/ONCOLOGY


Hx Hematology/Oncology Disorders: No





Family Medical History


Hx Cancer: Father, Mother


Hx Depression: Father, Mother


Hx Stroke: Grandparents





Physical Exam





- General


General Appearance: Alert, Oriented x3, Cooperative, No acute distress


Limitations: No limitations





- Head


Head exam: Atraumatic, Normal inspection





- Eye


Eye exam: Normal appearance.  negative: Conjunctival injection





- ENT


ENT exam: Normal exam


Ear exam: Normal external inspection


Nasal Exam: Normal inspection


Mouth exam: Normal external inspection





- Neck


Neck exam: Normal inspection





- Respiratory


Respiratory exam: Normal lung sounds bilaterally.  negative: Wheezes





- Extremities


Extremities exam: negative: Normal inspection





- Back


Back exam: Denies: Normal inspection





- Neurological


Neurological exam: Alert, Oriented X3





- Psychiatric


Psychiatric exam: Normal affect, Normal mood





- Skin


Skin exam: Erythema, Rash.  negative: Vesicles


Distribution of rash: Generalized


Description of rash: Crusting, Papular (scabs, some noted on the feet)





Disposition


Disposition: Discharge


Clinical Impression: 


 Rash, Scabies





Disposition: Home, Self-Care


Condition: (1) Good


Instructions:  Scabies (ED)


Additional Instructions: 


Wash all clothing and bedding in hot water


Follow up tomorrow with your doctor as scheduled.


Prescriptions: 


Permethrin [Elimite] 60 gm TP WEEKLY #1 cream..g.


Permethrin [Nix] 1 apply TOP ASDIR #59 ml


Forms:  Patient Portal Access


Time of Disposition: 11:12





Quality





- Quality Measures


Quality Measures: N/A





- Blood Pressure Screening


Does Patient Have Any of the Following: No


Blood Pressure Classification: Hypertensive Reading


Systolic Measurement: 156


Diastolic Measurement: 101


Screening for High Blood Pressure: < Pre-Hypertensive BP, F/U Documented > 

[]


Pre-Hypertensive Follow-up Interventions: Referral to alternative/primary care alessandra hyde